# Patient Record
Sex: MALE | Race: BLACK OR AFRICAN AMERICAN | Employment: UNEMPLOYED | URBAN - METROPOLITAN AREA
[De-identification: names, ages, dates, MRNs, and addresses within clinical notes are randomized per-mention and may not be internally consistent; named-entity substitution may affect disease eponyms.]

---

## 2018-05-03 ENCOUNTER — APPOINTMENT (OUTPATIENT)
Dept: GENERAL RADIOLOGY | Age: 28
DRG: 912 | End: 2018-05-03
Payer: COMMERCIAL

## 2018-05-03 ENCOUNTER — ANESTHESIA EVENT (OUTPATIENT)
Dept: OPERATING ROOM | Age: 28
DRG: 912 | End: 2018-05-03
Payer: COMMERCIAL

## 2018-05-03 ENCOUNTER — APPOINTMENT (OUTPATIENT)
Dept: CT IMAGING | Age: 28
DRG: 912 | End: 2018-05-03
Payer: COMMERCIAL

## 2018-05-03 ENCOUNTER — ANESTHESIA (OUTPATIENT)
Dept: OPERATING ROOM | Age: 28
DRG: 912 | End: 2018-05-03
Payer: COMMERCIAL

## 2018-05-03 ENCOUNTER — HOSPITAL ENCOUNTER (INPATIENT)
Age: 28
LOS: 3 days | Discharge: HOME HEALTH | DRG: 912 | End: 2018-05-06
Attending: EMERGENCY MEDICINE | Admitting: SURGERY
Payer: COMMERCIAL

## 2018-05-03 VITALS
DIASTOLIC BLOOD PRESSURE: 106 MMHG | RESPIRATION RATE: 13 BRPM | OXYGEN SATURATION: 100 % | TEMPERATURE: 96.8 F | SYSTOLIC BLOOD PRESSURE: 170 MMHG
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
## 2018-05-03 DIAGNOSIS — T14.90XA TRAUMA: ICD-10-CM

## 2018-05-03 LAB
% INHIBITION AA: 2.3 %
% INHIBITION ADP: 29.2 %
ABO/RH: NORMAL
ACETAMINOPHEN LEVEL: <5 MCG/ML (ref 10–30)
ALBUMIN SERPL-MCNC: 4.8 G/DL (ref 3.5–5.2)
ALP BLD-CCNC: 67 U/L (ref 40–129)
ALT SERPL-CCNC: 16 U/L (ref 0–40)
ANGLE (CLOT STRENGTH): 70.7 DEGREE (ref 59–74)
ANION GAP SERPL CALCULATED.3IONS-SCNC: 29 MMOL/L (ref 7–16)
ANTIBODY SCREEN: NORMAL
APTT: 31.7 SEC (ref 24.5–35.1)
AST SERPL-CCNC: 30 U/L (ref 0–39)
B.E.: -12.7 MMOL/L (ref -3–3)
BILIRUB SERPL-MCNC: 0.5 MG/DL (ref 0–1.2)
BUN BLDV-MCNC: 11 MG/DL (ref 6–20)
CALCIUM SERPL-MCNC: 9.2 MG/DL (ref 8.6–10.2)
CHLORIDE BLD-SCNC: 98 MMOL/L (ref 98–107)
CK MB: 2.9 NG/ML (ref 0–7.7)
CO2: 16 MMOL/L (ref 22–29)
COHB: 0.9 % (ref 0–1.5)
CREAT SERPL-MCNC: 1.2 MG/DL (ref 0.7–1.2)
CRITICAL: ABNORMAL
DATE ANALYZED: ABNORMAL
DATE OF COLLECTION: ABNORMAL
EPL-TEG: 12.9 % (ref 0–15)
ETHANOL: <10 MG/DL (ref 0–0.08)
G-TEG: 5.9 K D/SC (ref 4.5–11)
GFR AFRICAN AMERICAN: >60
GFR NON-AFRICAN AMERICAN: >60 ML/MIN/1.73
GLUCOSE BLD-MCNC: 145 MG/DL (ref 74–109)
HCO3: 13.4 MMOL/L (ref 22–26)
HCT VFR BLD CALC: 44.4 % (ref 37–54)
HEMOGLOBIN: 14.6 G/DL (ref 12.5–16.5)
HHB: 0.6 % (ref 0–5)
INR BLD: 1
K (CLOTTING TIME): 1.2 MIN (ref 1–3)
LAB: ABNORMAL
LACTIC ACID: 13.8 MMOL/L (ref 0.5–2.2)
LY30 (FIBRINOLYSIS): 12.9 % (ref 0–8)
Lab: 1840
MA (MAX AMPLITUDE): 54.1 MM (ref 50–70)
MA-AA: 52.9 MM
MA-ACTIVATED: 2.4 MM
MA-ADP: 39 MM
MA-TEG BASELINE: 54.1 MM
MCH RBC QN AUTO: 28.4 PG (ref 26–35)
MCHC RBC AUTO-ENTMCNC: 32.9 % (ref 32–34.5)
MCV RBC AUTO: 86.4 FL (ref 80–99.9)
METHB: 0.5 % (ref 0–1.5)
MODE: ABNORMAL
O2 CONTENT: 21.6 ML/DL
O2 SATURATION: 99.4 % (ref 92–98.5)
O2HB: 98 % (ref 94–97)
OPERATOR ID: 7490
PATIENT TEMP: 37 C
PCO2: 31.9 MMHG (ref 35–45)
PDW BLD-RTO: 12.7 FL (ref 11.5–15)
PH BLOOD GAS: 7.24 (ref 7.35–7.45)
PLATELET # BLD: 262 E9/L (ref 130–450)
PMV BLD AUTO: 11.3 FL (ref 7–12)
PO2: 400.6 MMHG (ref 60–100)
POTASSIUM SERPL-SCNC: 3.02 MMOL/L (ref 3.3–5.1)
POTASSIUM SERPL-SCNC: 3.5 MMOL/L (ref 3.5–5)
PROTHROMBIN TIME: 11.4 SEC (ref 9.3–12.4)
R (REACTION TIME): 4.7 MIN (ref 5–10)
RBC # BLD: 5.14 E12/L (ref 3.8–5.8)
SALICYLATE, SERUM: <0.3 MG/DL (ref 0–30)
SODIUM BLD-SCNC: 143 MMOL/L (ref 132–146)
SOURCE, BLOOD GAS: ABNORMAL
THB: 14.9 G/DL (ref 11.5–16.5)
TIME ANALYZED: 1843
TOTAL CK: 490 U/L (ref 20–200)
TOTAL PROTEIN: 7.3 G/DL (ref 6.4–8.3)
TRICYCLIC ANTIDEPRESSANTS SCREEN SERUM: NEGATIVE NG/ML
TROPONIN: <0.01 NG/ML (ref 0–0.03)
WBC # BLD: 14.1 E9/L (ref 4.5–11.5)

## 2018-05-03 PROCEDURE — 0WJG4ZZ INSPECTION OF PERITONEAL CAVITY, PERCUTANEOUS ENDOSCOPIC APPROACH: ICD-10-PCS | Performed by: SURGERY

## 2018-05-03 PROCEDURE — 70450 CT HEAD/BRAIN W/O DYE: CPT

## 2018-05-03 PROCEDURE — 2709999900 HC NON-CHARGEABLE SUPPLY: Performed by: SURGERY

## 2018-05-03 PROCEDURE — 1200000000 HC SEMI PRIVATE

## 2018-05-03 PROCEDURE — 84484 ASSAY OF TROPONIN QUANT: CPT

## 2018-05-03 PROCEDURE — 85384 FIBRINOGEN ACTIVITY: CPT

## 2018-05-03 PROCEDURE — 84132 ASSAY OF SERUM POTASSIUM: CPT

## 2018-05-03 PROCEDURE — 80307 DRUG TEST PRSMV CHEM ANLYZR: CPT

## 2018-05-03 PROCEDURE — 85730 THROMBOPLASTIN TIME PARTIAL: CPT

## 2018-05-03 PROCEDURE — 6360000002 HC RX W HCPCS: Performed by: NURSE ANESTHETIST, CERTIFIED REGISTERED

## 2018-05-03 PROCEDURE — 88304 TISSUE EXAM BY PATHOLOGIST: CPT

## 2018-05-03 PROCEDURE — 49320 DIAG LAPARO SEPARATE PROC: CPT | Performed by: SURGERY

## 2018-05-03 PROCEDURE — 86900 BLOOD TYPING SEROLOGIC ABO: CPT

## 2018-05-03 PROCEDURE — 75635 CT ANGIO ABDOMINAL ARTERIES: CPT

## 2018-05-03 PROCEDURE — 36410 VNPNXR 3YR/> PHY/QHP DX/THER: CPT | Performed by: SURGERY

## 2018-05-03 PROCEDURE — 27508 TREATMENT OF THIGH FRACTURE: CPT | Performed by: ORTHOPAEDIC SURGERY

## 2018-05-03 PROCEDURE — 2580000003 HC RX 258: Performed by: STUDENT IN AN ORGANIZED HEALTH CARE EDUCATION/TRAINING PROGRAM

## 2018-05-03 PROCEDURE — 85610 PROTHROMBIN TIME: CPT

## 2018-05-03 PROCEDURE — 6360000002 HC RX W HCPCS: Performed by: STUDENT IN AN ORGANIZED HEALTH CARE EDUCATION/TRAINING PROGRAM

## 2018-05-03 PROCEDURE — 86901 BLOOD TYPING SEROLOGIC RH(D): CPT

## 2018-05-03 PROCEDURE — 80053 COMPREHEN METABOLIC PANEL: CPT

## 2018-05-03 PROCEDURE — 86923 COMPATIBILITY TEST ELECTRIC: CPT

## 2018-05-03 PROCEDURE — 82805 BLOOD GASES W/O2 SATURATION: CPT

## 2018-05-03 PROCEDURE — 99255 IP/OBS CONSLTJ NEW/EST HI 80: CPT | Performed by: SURGERY

## 2018-05-03 PROCEDURE — 86850 RBC ANTIBODY SCREEN: CPT

## 2018-05-03 PROCEDURE — 36415 COLL VENOUS BLD VENIPUNCTURE: CPT

## 2018-05-03 PROCEDURE — 85347 COAGULATION TIME ACTIVATED: CPT

## 2018-05-03 PROCEDURE — 74018 RADEX ABDOMEN 1 VIEW: CPT

## 2018-05-03 PROCEDURE — 83605 ASSAY OF LACTIC ACID: CPT

## 2018-05-03 PROCEDURE — 3700000001 HC ADD 15 MINUTES (ANESTHESIA): Performed by: SURGERY

## 2018-05-03 PROCEDURE — 73560 X-RAY EXAM OF KNEE 1 OR 2: CPT

## 2018-05-03 PROCEDURE — 71260 CT THORAX DX C+: CPT

## 2018-05-03 PROCEDURE — 82553 CREATINE MB FRACTION: CPT

## 2018-05-03 PROCEDURE — 2500000003 HC RX 250 WO HCPCS: Performed by: NURSE ANESTHETIST, CERTIFIED REGISTERED

## 2018-05-03 PROCEDURE — 35741: CPT | Performed by: SURGERY

## 2018-05-03 PROCEDURE — 99253 IP/OBS CNSLTJ NEW/EST LOW 45: CPT | Performed by: ORTHOPAEDIC SURGERY

## 2018-05-03 PROCEDURE — 71045 X-RAY EXAM CHEST 1 VIEW: CPT

## 2018-05-03 PROCEDURE — 82550 ASSAY OF CK (CPK): CPT

## 2018-05-03 PROCEDURE — 36000 PLACE NEEDLE IN VEIN: CPT | Performed by: SURGERY

## 2018-05-03 PROCEDURE — 6360000004 HC RX CONTRAST MEDICATION: Performed by: RADIOLOGY

## 2018-05-03 PROCEDURE — 3600000014 HC SURGERY LEVEL 4 ADDTL 15MIN: Performed by: SURGERY

## 2018-05-03 PROCEDURE — 99291 CRITICAL CARE FIRST HOUR: CPT

## 2018-05-03 PROCEDURE — 94150 VITAL CAPACITY TEST: CPT

## 2018-05-03 PROCEDURE — 27331 EXPLORE/TREAT KNEE JOINT: CPT | Performed by: ORTHOPAEDIC SURGERY

## 2018-05-03 PROCEDURE — 7100000001 HC PACU RECOVERY - ADDTL 15 MIN: Performed by: SURGERY

## 2018-05-03 PROCEDURE — 3700000000 HC ANESTHESIA ATTENDED CARE: Performed by: SURGERY

## 2018-05-03 PROCEDURE — 85576 BLOOD PLATELET AGGREGATION: CPT

## 2018-05-03 PROCEDURE — 3600000004 HC SURGERY LEVEL 4 BASE: Performed by: SURGERY

## 2018-05-03 PROCEDURE — 85027 COMPLETE CBC AUTOMATED: CPT

## 2018-05-03 PROCEDURE — 99223 1ST HOSP IP/OBS HIGH 75: CPT | Performed by: SURGERY

## 2018-05-03 PROCEDURE — 72170 X-RAY EXAM OF PELVIS: CPT

## 2018-05-03 PROCEDURE — 27197 CLSD TX PELVIC RING FX: CPT | Performed by: ORTHOPAEDIC SURGERY

## 2018-05-03 PROCEDURE — 7100000000 HC PACU RECOVERY - FIRST 15 MIN: Performed by: SURGERY

## 2018-05-03 PROCEDURE — 36600 WITHDRAWAL OF ARTERIAL BLOOD: CPT | Performed by: SURGERY

## 2018-05-03 RX ORDER — ONDANSETRON 2 MG/ML
4 INJECTION INTRAMUSCULAR; INTRAVENOUS EVERY 6 HOURS PRN
Status: DISCONTINUED | OUTPATIENT
Start: 2018-05-03 | End: 2018-05-03 | Stop reason: SDUPTHER

## 2018-05-03 RX ORDER — FENTANYL CITRATE 50 UG/ML
INJECTION, SOLUTION INTRAMUSCULAR; INTRAVENOUS PRN
Status: DISCONTINUED | OUTPATIENT
Start: 2018-05-03 | End: 2018-05-03 | Stop reason: SDUPTHER

## 2018-05-03 RX ORDER — MORPHINE SULFATE 2 MG/ML
2 INJECTION, SOLUTION INTRAMUSCULAR; INTRAVENOUS
Status: DISCONTINUED | OUTPATIENT
Start: 2018-05-03 | End: 2018-05-05

## 2018-05-03 RX ORDER — MIDAZOLAM HYDROCHLORIDE 1 MG/ML
INJECTION INTRAMUSCULAR; INTRAVENOUS PRN
Status: DISCONTINUED | OUTPATIENT
Start: 2018-05-03 | End: 2018-05-03 | Stop reason: SDUPTHER

## 2018-05-03 RX ORDER — SODIUM CHLORIDE 0.9 % (FLUSH) 0.9 %
10 SYRINGE (ML) INJECTION PRN
Status: DISCONTINUED | OUTPATIENT
Start: 2018-05-03 | End: 2018-05-06 | Stop reason: HOSPADM

## 2018-05-03 RX ORDER — SODIUM CHLORIDE 0.9 % (FLUSH) 0.9 %
10 SYRINGE (ML) INJECTION EVERY 12 HOURS SCHEDULED
Status: DISCONTINUED | OUTPATIENT
Start: 2018-05-03 | End: 2018-05-06 | Stop reason: HOSPADM

## 2018-05-03 RX ORDER — ACETAMINOPHEN 325 MG/1
650 TABLET ORAL EVERY 4 HOURS
Status: DISCONTINUED | OUTPATIENT
Start: 2018-05-03 | End: 2018-05-06 | Stop reason: HOSPADM

## 2018-05-03 RX ORDER — ONDANSETRON 2 MG/ML
INJECTION INTRAMUSCULAR; INTRAVENOUS PRN
Status: DISCONTINUED | OUTPATIENT
Start: 2018-05-03 | End: 2018-05-03 | Stop reason: SDUPTHER

## 2018-05-03 RX ORDER — SUCCINYLCHOLINE CHLORIDE 20 MG/ML
INJECTION INTRAMUSCULAR; INTRAVENOUS PRN
Status: DISCONTINUED | OUTPATIENT
Start: 2018-05-03 | End: 2018-05-03 | Stop reason: SDUPTHER

## 2018-05-03 RX ORDER — BISACODYL 10 MG
10 SUPPOSITORY, RECTAL RECTAL DAILY PRN
Status: DISCONTINUED | OUTPATIENT
Start: 2018-05-03 | End: 2018-05-06 | Stop reason: HOSPADM

## 2018-05-03 RX ORDER — DOCUSATE SODIUM 100 MG/1
100 CAPSULE, LIQUID FILLED ORAL 2 TIMES DAILY
Status: DISCONTINUED | OUTPATIENT
Start: 2018-05-03 | End: 2018-05-06 | Stop reason: HOSPADM

## 2018-05-03 RX ORDER — ONDANSETRON 2 MG/ML
4 INJECTION INTRAMUSCULAR; INTRAVENOUS EVERY 6 HOURS PRN
Status: DISCONTINUED | OUTPATIENT
Start: 2018-05-03 | End: 2018-05-06 | Stop reason: HOSPADM

## 2018-05-03 RX ORDER — PROPOFOL 10 MG/ML
INJECTION, EMULSION INTRAVENOUS PRN
Status: DISCONTINUED | OUTPATIENT
Start: 2018-05-03 | End: 2018-05-03 | Stop reason: SDUPTHER

## 2018-05-03 RX ORDER — OXYCODONE HYDROCHLORIDE 5 MG/1
5 TABLET ORAL EVERY 4 HOURS PRN
Status: DISCONTINUED | OUTPATIENT
Start: 2018-05-03 | End: 2018-05-06 | Stop reason: HOSPADM

## 2018-05-03 RX ORDER — GLYCOPYRROLATE 1 MG/5 ML
SYRINGE (ML) INTRAVENOUS PRN
Status: DISCONTINUED | OUTPATIENT
Start: 2018-05-03 | End: 2018-05-03 | Stop reason: SDUPTHER

## 2018-05-03 RX ORDER — SENNA AND DOCUSATE SODIUM 50; 8.6 MG/1; MG/1
1 TABLET, FILM COATED ORAL DAILY
Status: DISCONTINUED | OUTPATIENT
Start: 2018-05-04 | End: 2018-05-06 | Stop reason: HOSPADM

## 2018-05-03 RX ORDER — MORPHINE SULFATE 4 MG/ML
4 INJECTION, SOLUTION INTRAMUSCULAR; INTRAVENOUS
Status: DISCONTINUED | OUTPATIENT
Start: 2018-05-03 | End: 2018-05-05

## 2018-05-03 RX ORDER — NEOSTIGMINE METHYLSULFATE 1 MG/ML
INJECTION, SOLUTION INTRAVENOUS PRN
Status: DISCONTINUED | OUTPATIENT
Start: 2018-05-03 | End: 2018-05-03 | Stop reason: SDUPTHER

## 2018-05-03 RX ORDER — OXYCODONE HYDROCHLORIDE 10 MG/1
10 TABLET ORAL EVERY 4 HOURS PRN
Status: DISCONTINUED | OUTPATIENT
Start: 2018-05-03 | End: 2018-05-06 | Stop reason: HOSPADM

## 2018-05-03 RX ORDER — ROCURONIUM BROMIDE 10 MG/ML
INJECTION, SOLUTION INTRAVENOUS PRN
Status: DISCONTINUED | OUTPATIENT
Start: 2018-05-03 | End: 2018-05-03 | Stop reason: SDUPTHER

## 2018-05-03 RX ADMIN — Medication 140 MG: at 20:06

## 2018-05-03 RX ADMIN — PROPOFOL 150 MG: 10 INJECTION, EMULSION INTRAVENOUS at 20:06

## 2018-05-03 RX ADMIN — CEFOXITIN 2 G: 2 INJECTION, POWDER, FOR SOLUTION INTRAVENOUS at 20:09

## 2018-05-03 RX ADMIN — LIDOCAINE HYDROCHLORIDE 100 MG: 20 INJECTION, SOLUTION INTRAVENOUS at 20:06

## 2018-05-03 RX ADMIN — FENTANYL CITRATE 50 MCG: 50 INJECTION, SOLUTION INTRAMUSCULAR; INTRAVENOUS at 20:43

## 2018-05-03 RX ADMIN — ONDANSETRON 4 MG: 2 INJECTION INTRAMUSCULAR; INTRAVENOUS at 21:27

## 2018-05-03 RX ADMIN — Medication 0.6 MG: at 21:26

## 2018-05-03 RX ADMIN — FENTANYL CITRATE 100 MCG: 50 INJECTION, SOLUTION INTRAMUSCULAR; INTRAVENOUS at 20:06

## 2018-05-03 RX ADMIN — MIDAZOLAM 2 MG: 1 INJECTION INTRAMUSCULAR; INTRAVENOUS at 20:03

## 2018-05-03 RX ADMIN — IOPAMIDOL 200 ML: 755 INJECTION, SOLUTION INTRAVENOUS at 19:20

## 2018-05-03 RX ADMIN — ROCURONIUM BROMIDE 10 MG: 10 INJECTION, SOLUTION INTRAVENOUS at 20:06

## 2018-05-03 RX ADMIN — Medication 3 MG: at 21:26

## 2018-05-03 RX ADMIN — FENTANYL CITRATE 100 MCG: 50 INJECTION, SOLUTION INTRAMUSCULAR; INTRAVENOUS at 20:35

## 2018-05-03 ASSESSMENT — PULMONARY FUNCTION TESTS
PIF_VALUE: 18
PIF_VALUE: 17
PIF_VALUE: 22
PIF_VALUE: 17
PIF_VALUE: 22
PIF_VALUE: 17
PIF_VALUE: 17
PIF_VALUE: 21
PIF_VALUE: 1
PIF_VALUE: 21
PIF_VALUE: 20
PIF_VALUE: 23
PIF_VALUE: 18
PIF_VALUE: 17
PIF_VALUE: 18
PIF_VALUE: 17
PIF_VALUE: 17
PIF_VALUE: 8
PIF_VALUE: 16
PIF_VALUE: 17
PIF_VALUE: 18
PIF_VALUE: 20
PIF_VALUE: 18
PIF_VALUE: 21
PIF_VALUE: 17
PIF_VALUE: 7
PIF_VALUE: 11
PIF_VALUE: 17
PIF_VALUE: 21
PIF_VALUE: 23
PIF_VALUE: 20
PIF_VALUE: 19
PIF_VALUE: 8
PIF_VALUE: 17
PIF_VALUE: 22
PIF_VALUE: 17
PIF_VALUE: 23
PIF_VALUE: 20
PIF_VALUE: 8
PIF_VALUE: 2
PIF_VALUE: 18
PIF_VALUE: 17
PIF_VALUE: 17
PIF_VALUE: 18
PIF_VALUE: 17
PIF_VALUE: 24
PIF_VALUE: 19
PIF_VALUE: 18
PIF_VALUE: 18
PIF_VALUE: 17
PIF_VALUE: 17
PIF_VALUE: 16
PIF_VALUE: 16
PIF_VALUE: 8
PIF_VALUE: 11
PIF_VALUE: 7
PIF_VALUE: 17
PIF_VALUE: 18
PIF_VALUE: 16
PIF_VALUE: 22
PIF_VALUE: 17
PIF_VALUE: 19
PIF_VALUE: 18
PIF_VALUE: 19
PIF_VALUE: 1
PIF_VALUE: 17
PIF_VALUE: 17
PIF_VALUE: 8
PIF_VALUE: 19
PIF_VALUE: 17
PIF_VALUE: 16
PIF_VALUE: 21
PIF_VALUE: 17
PIF_VALUE: 23
PIF_VALUE: 17
PIF_VALUE: 18
PIF_VALUE: 17
PIF_VALUE: 16
PIF_VALUE: 12
PIF_VALUE: 13
PIF_VALUE: 20
PIF_VALUE: 7
PIF_VALUE: 20
PIF_VALUE: 17
PIF_VALUE: 18
PIF_VALUE: 10
PIF_VALUE: 19
PIF_VALUE: 18
PIF_VALUE: 7
PIF_VALUE: 13
PIF_VALUE: 16
PIF_VALUE: 17
PIF_VALUE: 26
PIF_VALUE: 18
PIF_VALUE: 17
PIF_VALUE: 23
PIF_VALUE: 18
PIF_VALUE: 4
PIF_VALUE: 17
PIF_VALUE: 18
PIF_VALUE: 18
PIF_VALUE: 19
PIF_VALUE: 5
PIF_VALUE: 12
PIF_VALUE: 23
PIF_VALUE: 18

## 2018-05-03 ASSESSMENT — PAIN SCALES - GENERAL
PAINLEVEL_OUTOF10: 0

## 2018-05-03 ASSESSMENT — PAIN DESCRIPTION - PAIN TYPE
TYPE: SURGICAL PAIN
TYPE: SURGICAL PAIN

## 2018-05-03 ASSESSMENT — PAIN DESCRIPTION - LOCATION: LOCATION: ABDOMEN

## 2018-05-04 ENCOUNTER — APPOINTMENT (OUTPATIENT)
Dept: CT IMAGING | Age: 28
DRG: 912 | End: 2018-05-04
Payer: COMMERCIAL

## 2018-05-04 PROBLEM — S00.01XA SCALP ABRASION: Status: ACTIVE | Noted: 2018-05-04

## 2018-05-04 PROBLEM — S72.421B: Status: ACTIVE | Noted: 2018-05-04

## 2018-05-04 PROBLEM — S32.301A CLOSED FRACTURE OF RIGHT ILIAC WING (HCC): Status: ACTIVE | Noted: 2018-05-04

## 2018-05-04 PROBLEM — S82.131B: Status: ACTIVE | Noted: 2018-05-04

## 2018-05-04 PROBLEM — S72.411S: Status: ACTIVE | Noted: 2018-05-04

## 2018-05-04 LAB
ALBUMIN SERPL-MCNC: 3.7 G/DL (ref 3.5–5.2)
ALP BLD-CCNC: 40 U/L (ref 40–129)
ALT SERPL-CCNC: 23 U/L (ref 0–40)
ANION GAP SERPL CALCULATED.3IONS-SCNC: 12 MMOL/L (ref 7–16)
AST SERPL-CCNC: 56 U/L (ref 0–39)
BASOPHILS ABSOLUTE: 0.03 E9/L (ref 0–0.2)
BASOPHILS RELATIVE PERCENT: 0.2 % (ref 0–2)
BILIRUB SERPL-MCNC: 0.6 MG/DL (ref 0–1.2)
BUN BLDV-MCNC: 8 MG/DL (ref 6–20)
CALCIUM SERPL-MCNC: 8 MG/DL (ref 8.6–10.2)
CHLORIDE BLD-SCNC: 99 MMOL/L (ref 98–107)
CO2: 23 MMOL/L (ref 22–29)
CREAT SERPL-MCNC: 0.9 MG/DL (ref 0.7–1.2)
EOSINOPHILS ABSOLUTE: 0 E9/L (ref 0.05–0.5)
EOSINOPHILS RELATIVE PERCENT: 0 % (ref 0–6)
GFR AFRICAN AMERICAN: >60
GFR NON-AFRICAN AMERICAN: >60 ML/MIN/1.73
GLUCOSE BLD-MCNC: 147 MG/DL (ref 74–109)
HCT VFR BLD CALC: 34.6 % (ref 37–54)
HEMOGLOBIN: 11.9 G/DL (ref 12.5–16.5)
IMMATURE GRANULOCYTES #: 0.08 E9/L
IMMATURE GRANULOCYTES %: 0.5 % (ref 0–5)
LYMPHOCYTES ABSOLUTE: 1.11 E9/L (ref 1.5–4)
LYMPHOCYTES RELATIVE PERCENT: 6.3 % (ref 20–42)
MCH RBC QN AUTO: 28.6 PG (ref 26–35)
MCHC RBC AUTO-ENTMCNC: 34.4 % (ref 32–34.5)
MCV RBC AUTO: 83.2 FL (ref 80–99.9)
MONOCYTES ABSOLUTE: 1.77 E9/L (ref 0.1–0.95)
MONOCYTES RELATIVE PERCENT: 10 % (ref 2–12)
NEUTROPHILS ABSOLUTE: 14.69 E9/L (ref 1.8–7.3)
NEUTROPHILS RELATIVE PERCENT: 83 % (ref 43–80)
PDW BLD-RTO: 12.7 FL (ref 11.5–15)
PLATELET # BLD: 204 E9/L (ref 130–450)
PMV BLD AUTO: 11.2 FL (ref 7–12)
POTASSIUM REFLEX MAGNESIUM: 3.9 MMOL/L (ref 3.5–5)
RBC # BLD: 4.16 E12/L (ref 3.8–5.8)
RBC # BLD: NORMAL 10*6/UL
SODIUM BLD-SCNC: 134 MMOL/L (ref 132–146)
TOTAL PROTEIN: 5.8 G/DL (ref 6.4–8.3)
WBC # BLD: 17.7 E9/L (ref 4.5–11.5)

## 2018-05-04 PROCEDURE — 80053 COMPREHEN METABOLIC PANEL: CPT

## 2018-05-04 PROCEDURE — 6360000002 HC RX W HCPCS: Performed by: STUDENT IN AN ORGANIZED HEALTH CARE EDUCATION/TRAINING PROGRAM

## 2018-05-04 PROCEDURE — 1200000000 HC SEMI PRIVATE

## 2018-05-04 PROCEDURE — 85025 COMPLETE CBC W/AUTO DIFF WBC: CPT

## 2018-05-04 PROCEDURE — 97165 OT EVAL LOW COMPLEX 30 MIN: CPT

## 2018-05-04 PROCEDURE — 97162 PT EVAL MOD COMPLEX 30 MIN: CPT

## 2018-05-04 PROCEDURE — 2580000003 HC RX 258: Performed by: STUDENT IN AN ORGANIZED HEALTH CARE EDUCATION/TRAINING PROGRAM

## 2018-05-04 PROCEDURE — 99024 POSTOP FOLLOW-UP VISIT: CPT | Performed by: ORTHOPAEDIC SURGERY

## 2018-05-04 PROCEDURE — G8978 MOBILITY CURRENT STATUS: HCPCS

## 2018-05-04 PROCEDURE — 6370000000 HC RX 637 (ALT 250 FOR IP): Performed by: STUDENT IN AN ORGANIZED HEALTH CARE EDUCATION/TRAINING PROGRAM

## 2018-05-04 PROCEDURE — G8979 MOBILITY GOAL STATUS: HCPCS

## 2018-05-04 PROCEDURE — 0SBC0ZZ EXCISION OF RIGHT KNEE JOINT, OPEN APPROACH: ICD-10-PCS | Performed by: ORTHOPAEDIC SURGERY

## 2018-05-04 PROCEDURE — 73700 CT LOWER EXTREMITY W/O DYE: CPT

## 2018-05-04 PROCEDURE — G8987 SELF CARE CURRENT STATUS: HCPCS

## 2018-05-04 PROCEDURE — 99232 SBSQ HOSP IP/OBS MODERATE 35: CPT | Performed by: SURGERY

## 2018-05-04 PROCEDURE — G8988 SELF CARE GOAL STATUS: HCPCS

## 2018-05-04 PROCEDURE — 0JDN0ZZ EXTRACTION OF RIGHT LOWER LEG SUBCUTANEOUS TISSUE AND FASCIA, OPEN APPROACH: ICD-10-PCS | Performed by: ORTHOPAEDIC SURGERY

## 2018-05-04 PROCEDURE — 36415 COLL VENOUS BLD VENIPUNCTURE: CPT

## 2018-05-04 RX ORDER — DIAPER,BRIEF,INFANT-TODD,DISP
EACH MISCELLANEOUS 3 TIMES DAILY
Status: DISCONTINUED | OUTPATIENT
Start: 2018-05-04 | End: 2018-05-06 | Stop reason: HOSPADM

## 2018-05-04 RX ADMIN — CEFAZOLIN SODIUM 2 G: 2 SOLUTION INTRAVENOUS at 13:48

## 2018-05-04 RX ADMIN — OXYCODONE HYDROCHLORIDE 10 MG: 10 TABLET ORAL at 10:55

## 2018-05-04 RX ADMIN — ACETAMINOPHEN 650 MG: 325 TABLET, FILM COATED ORAL at 05:27

## 2018-05-04 RX ADMIN — Medication 10 ML: at 21:55

## 2018-05-04 RX ADMIN — Medication 10 ML: at 01:24

## 2018-05-04 RX ADMIN — OXYCODONE HYDROCHLORIDE 10 MG: 10 TABLET ORAL at 21:55

## 2018-05-04 RX ADMIN — ENOXAPARIN SODIUM 30 MG: 30 INJECTION SUBCUTANEOUS at 21:55

## 2018-05-04 RX ADMIN — CEFAZOLIN SODIUM 2 G: 2 SOLUTION INTRAVENOUS at 05:28

## 2018-05-04 RX ADMIN — OXYCODONE HYDROCHLORIDE 10 MG: 10 TABLET ORAL at 01:23

## 2018-05-04 RX ADMIN — ACETAMINOPHEN 650 MG: 325 TABLET, FILM COATED ORAL at 09:55

## 2018-05-04 RX ADMIN — OXYCODONE HYDROCHLORIDE 10 MG: 10 TABLET ORAL at 16:01

## 2018-05-04 RX ADMIN — ACETAMINOPHEN 650 MG: 325 TABLET, FILM COATED ORAL at 21:55

## 2018-05-04 RX ADMIN — DOCUSATE SODIUM 100 MG: 100 CAPSULE, LIQUID FILLED ORAL at 21:55

## 2018-05-04 RX ADMIN — ENOXAPARIN SODIUM 30 MG: 30 INJECTION SUBCUTANEOUS at 09:55

## 2018-05-04 RX ADMIN — STANDARDIZED SENNA CONCENTRATE AND DOCUSATE SODIUM 1 TABLET: 8.6; 5 TABLET, FILM COATED ORAL at 09:55

## 2018-05-04 RX ADMIN — MORPHINE SULFATE 4 MG: 4 INJECTION, SOLUTION INTRAMUSCULAR; INTRAVENOUS at 17:25

## 2018-05-04 RX ADMIN — ACETAMINOPHEN 650 MG: 325 TABLET, FILM COATED ORAL at 01:23

## 2018-05-04 RX ADMIN — MORPHINE SULFATE 4 MG: 4 INJECTION, SOLUTION INTRAMUSCULAR; INTRAVENOUS at 09:53

## 2018-05-04 RX ADMIN — DOCUSATE SODIUM 100 MG: 100 CAPSULE, LIQUID FILLED ORAL at 01:23

## 2018-05-04 RX ADMIN — ACETAMINOPHEN 650 MG: 325 TABLET, FILM COATED ORAL at 14:07

## 2018-05-04 RX ADMIN — OXYCODONE HYDROCHLORIDE 10 MG: 10 TABLET ORAL at 05:28

## 2018-05-04 RX ADMIN — BACITRACIN ZINC: 500 OINTMENT TOPICAL at 21:56

## 2018-05-04 RX ADMIN — DOCUSATE SODIUM 100 MG: 100 CAPSULE, LIQUID FILLED ORAL at 09:55

## 2018-05-04 ASSESSMENT — PAIN DESCRIPTION - DESCRIPTORS
DESCRIPTORS: ACHING;CONSTANT;DISCOMFORT
DESCRIPTORS: ACHING;CONSTANT;DISCOMFORT
DESCRIPTORS: ACHING;CONSTANT;TENDER
DESCRIPTORS: DISCOMFORT;SORE;TENDER
DESCRIPTORS: DISCOMFORT;SORE;TENDER
DESCRIPTORS: ACHING;CONSTANT;DISCOMFORT
DESCRIPTORS: DISCOMFORT;SORE
DESCRIPTORS: ACHING;CONSTANT;DISCOMFORT
DESCRIPTORS: DISCOMFORT;SHARP;SORE
DESCRIPTORS: DISCOMFORT;TENDER
DESCRIPTORS: ACHING;CONSTANT;DISCOMFORT
DESCRIPTORS: DISCOMFORT
DESCRIPTORS: ACHING;CONSTANT;DISCOMFORT

## 2018-05-04 ASSESSMENT — PAIN DESCRIPTION - LOCATION
LOCATION: ABDOMEN

## 2018-05-04 ASSESSMENT — PAIN SCALES - GENERAL
PAINLEVEL_OUTOF10: 2
PAINLEVEL_OUTOF10: 10
PAINLEVEL_OUTOF10: 9
PAINLEVEL_OUTOF10: 4
PAINLEVEL_OUTOF10: 6
PAINLEVEL_OUTOF10: 10
PAINLEVEL_OUTOF10: 10
PAINLEVEL_OUTOF10: 4
PAINLEVEL_OUTOF10: 9
PAINLEVEL_OUTOF10: 7
PAINLEVEL_OUTOF10: 4
PAINLEVEL_OUTOF10: 2
PAINLEVEL_OUTOF10: 10
PAINLEVEL_OUTOF10: 6
PAINLEVEL_OUTOF10: 10
PAINLEVEL_OUTOF10: 10
PAINLEVEL_OUTOF10: 4
PAINLEVEL_OUTOF10: 3

## 2018-05-04 ASSESSMENT — PAIN DESCRIPTION - PROGRESSION
CLINICAL_PROGRESSION: NOT CHANGED
CLINICAL_PROGRESSION: GRADUALLY IMPROVING
CLINICAL_PROGRESSION: NOT CHANGED

## 2018-05-04 ASSESSMENT — PAIN DESCRIPTION - ONSET
ONSET: ON-GOING
ONSET: OTHER (COMMENT)
ONSET: ON-GOING

## 2018-05-04 ASSESSMENT — PAIN DESCRIPTION - FREQUENCY
FREQUENCY: CONTINUOUS

## 2018-05-04 ASSESSMENT — PAIN DESCRIPTION - PAIN TYPE
TYPE: ACUTE PAIN;SURGICAL PAIN
TYPE: SURGICAL PAIN
TYPE: ACUTE PAIN;SURGICAL PAIN
TYPE: SURGICAL PAIN
TYPE: ACUTE PAIN;SURGICAL PAIN
TYPE: SURGICAL PAIN
TYPE: ACUTE PAIN;SURGICAL PAIN
TYPE: SURGICAL PAIN
TYPE: SURGICAL PAIN
TYPE: ACUTE PAIN
TYPE: SURGICAL PAIN
TYPE: ACUTE PAIN

## 2018-05-04 ASSESSMENT — PAIN DESCRIPTION - ORIENTATION
ORIENTATION: RIGHT

## 2018-05-05 LAB
ALBUMIN SERPL-MCNC: 3.8 G/DL (ref 3.5–5.2)
ALP BLD-CCNC: 45 U/L (ref 40–129)
ALT SERPL-CCNC: 26 U/L (ref 0–40)
ANION GAP SERPL CALCULATED.3IONS-SCNC: 16 MMOL/L (ref 7–16)
AST SERPL-CCNC: 61 U/L (ref 0–39)
BASOPHILS ABSOLUTE: 0.04 E9/L (ref 0–0.2)
BASOPHILS RELATIVE PERCENT: 0.3 % (ref 0–2)
BILIRUB SERPL-MCNC: 0.9 MG/DL (ref 0–1.2)
BUN BLDV-MCNC: 5 MG/DL (ref 6–20)
CALCIUM SERPL-MCNC: 8.8 MG/DL (ref 8.6–10.2)
CHLORIDE BLD-SCNC: 94 MMOL/L (ref 98–107)
CO2: 28 MMOL/L (ref 22–29)
CREAT SERPL-MCNC: 0.9 MG/DL (ref 0.7–1.2)
EOSINOPHILS ABSOLUTE: 0.06 E9/L (ref 0.05–0.5)
EOSINOPHILS RELATIVE PERCENT: 0.5 % (ref 0–6)
GFR AFRICAN AMERICAN: >60
GFR NON-AFRICAN AMERICAN: >60 ML/MIN/1.73
GLUCOSE BLD-MCNC: 105 MG/DL (ref 74–109)
HCT VFR BLD CALC: 34.1 % (ref 37–54)
HEMOGLOBIN: 11.7 G/DL (ref 12.5–16.5)
IMMATURE GRANULOCYTES #: 0.05 E9/L
IMMATURE GRANULOCYTES %: 0.4 % (ref 0–5)
LYMPHOCYTES ABSOLUTE: 1.1 E9/L (ref 1.5–4)
LYMPHOCYTES RELATIVE PERCENT: 8.5 % (ref 20–42)
MAGNESIUM: 1.9 MG/DL (ref 1.6–2.6)
MCH RBC QN AUTO: 28.5 PG (ref 26–35)
MCHC RBC AUTO-ENTMCNC: 34.3 % (ref 32–34.5)
MCV RBC AUTO: 83.2 FL (ref 80–99.9)
MONOCYTES ABSOLUTE: 1.3 E9/L (ref 0.1–0.95)
MONOCYTES RELATIVE PERCENT: 10.1 % (ref 2–12)
NEUTROPHILS ABSOLUTE: 10.35 E9/L (ref 1.8–7.3)
NEUTROPHILS RELATIVE PERCENT: 80.2 % (ref 43–80)
PDW BLD-RTO: 12.6 FL (ref 11.5–15)
PLATELET # BLD: 172 E9/L (ref 130–450)
PMV BLD AUTO: 11.4 FL (ref 7–12)
POTASSIUM REFLEX MAGNESIUM: 3.5 MMOL/L (ref 3.5–5)
RBC # BLD: 4.1 E12/L (ref 3.8–5.8)
SODIUM BLD-SCNC: 138 MMOL/L (ref 132–146)
TOTAL PROTEIN: 6.5 G/DL (ref 6.4–8.3)
WBC # BLD: 12.9 E9/L (ref 4.5–11.5)

## 2018-05-05 PROCEDURE — 1200000000 HC SEMI PRIVATE

## 2018-05-05 PROCEDURE — 6370000000 HC RX 637 (ALT 250 FOR IP): Performed by: STUDENT IN AN ORGANIZED HEALTH CARE EDUCATION/TRAINING PROGRAM

## 2018-05-05 PROCEDURE — 36415 COLL VENOUS BLD VENIPUNCTURE: CPT

## 2018-05-05 PROCEDURE — 6360000002 HC RX W HCPCS: Performed by: STUDENT IN AN ORGANIZED HEALTH CARE EDUCATION/TRAINING PROGRAM

## 2018-05-05 PROCEDURE — 99024 POSTOP FOLLOW-UP VISIT: CPT | Performed by: ORTHOPAEDIC SURGERY

## 2018-05-05 PROCEDURE — 80053 COMPREHEN METABOLIC PANEL: CPT

## 2018-05-05 PROCEDURE — 97530 THERAPEUTIC ACTIVITIES: CPT | Performed by: PHYSICAL THERAPIST

## 2018-05-05 PROCEDURE — 83735 ASSAY OF MAGNESIUM: CPT

## 2018-05-05 PROCEDURE — 85025 COMPLETE CBC W/AUTO DIFF WBC: CPT

## 2018-05-05 PROCEDURE — 2580000003 HC RX 258: Performed by: STUDENT IN AN ORGANIZED HEALTH CARE EDUCATION/TRAINING PROGRAM

## 2018-05-05 PROCEDURE — 99232 SBSQ HOSP IP/OBS MODERATE 35: CPT | Performed by: SURGERY

## 2018-05-05 RX ORDER — PSEUDOEPHEDRINE HCL 30 MG
100 TABLET ORAL 2 TIMES DAILY PRN
Qty: 100 CAPSULE | Refills: 0 | Status: SHIPPED | OUTPATIENT
Start: 2018-05-05 | End: 2018-06-25 | Stop reason: ALTCHOICE

## 2018-05-05 RX ORDER — METHOCARBAMOL 500 MG/1
1000 TABLET, FILM COATED ORAL 4 TIMES DAILY
Status: DISCONTINUED | OUTPATIENT
Start: 2018-05-05 | End: 2018-05-06 | Stop reason: HOSPADM

## 2018-05-05 RX ORDER — POLYETHYLENE GLYCOL 3350 17 G/17G
17 POWDER, FOR SOLUTION ORAL 2 TIMES DAILY
Status: DISCONTINUED | OUTPATIENT
Start: 2018-05-05 | End: 2018-05-06 | Stop reason: HOSPADM

## 2018-05-05 RX ORDER — DIPHENHYDRAMINE HYDROCHLORIDE 50 MG/ML
25 INJECTION INTRAMUSCULAR; INTRAVENOUS EVERY 6 HOURS PRN
Status: DISCONTINUED | OUTPATIENT
Start: 2018-05-05 | End: 2018-05-06 | Stop reason: HOSPADM

## 2018-05-05 RX ORDER — OXYCODONE HYDROCHLORIDE 5 MG/1
5 TABLET ORAL EVERY 6 HOURS PRN
Qty: 28 TABLET | Refills: 0 | Status: SHIPPED | OUTPATIENT
Start: 2018-05-05 | End: 2018-05-12

## 2018-05-05 RX ADMIN — OXYCODONE HYDROCHLORIDE 10 MG: 10 TABLET ORAL at 13:31

## 2018-05-05 RX ADMIN — POLYETHYLENE GLYCOL 3350 17 G: 17 POWDER, FOR SOLUTION ORAL at 21:12

## 2018-05-05 RX ADMIN — METHOCARBAMOL 1000 MG: 500 TABLET ORAL at 12:59

## 2018-05-05 RX ADMIN — POLYETHYLENE GLYCOL 3350 17 G: 17 POWDER, FOR SOLUTION ORAL at 17:50

## 2018-05-05 RX ADMIN — OXYCODONE HYDROCHLORIDE 10 MG: 10 TABLET ORAL at 17:46

## 2018-05-05 RX ADMIN — DOCUSATE SODIUM 100 MG: 100 CAPSULE, LIQUID FILLED ORAL at 21:13

## 2018-05-05 RX ADMIN — METHOCARBAMOL 1000 MG: 500 TABLET ORAL at 21:12

## 2018-05-05 RX ADMIN — BACITRACIN ZINC: 500 OINTMENT TOPICAL at 09:30

## 2018-05-05 RX ADMIN — ACETAMINOPHEN 650 MG: 325 TABLET, FILM COATED ORAL at 17:46

## 2018-05-05 RX ADMIN — ENOXAPARIN SODIUM 30 MG: 30 INJECTION SUBCUTANEOUS at 21:12

## 2018-05-05 RX ADMIN — ACETAMINOPHEN 650 MG: 325 TABLET, FILM COATED ORAL at 21:12

## 2018-05-05 RX ADMIN — BACITRACIN ZINC: 500 OINTMENT TOPICAL at 21:13

## 2018-05-05 RX ADMIN — ACETAMINOPHEN 650 MG: 325 TABLET, FILM COATED ORAL at 09:28

## 2018-05-05 RX ADMIN — DIPHENHYDRAMINE HYDROCHLORIDE 25 MG: 50 INJECTION, SOLUTION INTRAMUSCULAR; INTRAVENOUS at 15:42

## 2018-05-05 RX ADMIN — OXYCODONE HYDROCHLORIDE 10 MG: 10 TABLET ORAL at 05:32

## 2018-05-05 RX ADMIN — Medication 10 ML: at 21:13

## 2018-05-05 RX ADMIN — ENOXAPARIN SODIUM 30 MG: 30 INJECTION SUBCUTANEOUS at 09:30

## 2018-05-05 RX ADMIN — Medication 10 ML: at 09:32

## 2018-05-05 RX ADMIN — ACETAMINOPHEN 650 MG: 325 TABLET, FILM COATED ORAL at 12:58

## 2018-05-05 RX ADMIN — OXYCODONE HYDROCHLORIDE 10 MG: 10 TABLET ORAL at 09:28

## 2018-05-05 RX ADMIN — METHOCARBAMOL 1000 MG: 500 TABLET ORAL at 17:50

## 2018-05-05 RX ADMIN — MAGNESIUM HYDROXIDE 30 ML: 400 SUSPENSION ORAL at 19:10

## 2018-05-05 ASSESSMENT — PAIN SCALES - GENERAL
PAINLEVEL_OUTOF10: 9
PAINLEVEL_OUTOF10: 4
PAINLEVEL_OUTOF10: 10
PAINLEVEL_OUTOF10: 8
PAINLEVEL_OUTOF10: 0
PAINLEVEL_OUTOF10: 10
PAINLEVEL_OUTOF10: 8
PAINLEVEL_OUTOF10: 6
PAINLEVEL_OUTOF10: 8

## 2018-05-05 ASSESSMENT — PAIN DESCRIPTION - ORIENTATION
ORIENTATION: RIGHT
ORIENTATION: ANTERIOR

## 2018-05-05 ASSESSMENT — PAIN DESCRIPTION - LOCATION
LOCATION: ABDOMEN;PELVIS
LOCATION: ABDOMEN
LOCATION: ABDOMEN;LEG
LOCATION: ABDOMEN;LEG

## 2018-05-05 ASSESSMENT — PAIN DESCRIPTION - PAIN TYPE
TYPE: ACUTE PAIN
TYPE: ACUTE PAIN;SURGICAL PAIN
TYPE: SURGICAL PAIN

## 2018-05-05 ASSESSMENT — PAIN DESCRIPTION - DESCRIPTORS
DESCRIPTORS: DISCOMFORT
DESCRIPTORS: ACHING;DULL;CONSTANT
DESCRIPTORS: ACHING;CONSTANT
DESCRIPTORS: ACHING;DISCOMFORT;CONSTANT

## 2018-05-05 ASSESSMENT — PAIN DESCRIPTION - FREQUENCY: FREQUENCY: CONTINUOUS

## 2018-05-05 ASSESSMENT — PAIN DESCRIPTION - PROGRESSION: CLINICAL_PROGRESSION: GRADUALLY IMPROVING

## 2018-05-05 ASSESSMENT — PAIN DESCRIPTION - ONSET: ONSET: ON-GOING

## 2018-05-06 VITALS
TEMPERATURE: 98.8 F | BODY MASS INDEX: 23.34 KG/M2 | RESPIRATION RATE: 17 BRPM | OXYGEN SATURATION: 99 % | HEIGHT: 70 IN | SYSTOLIC BLOOD PRESSURE: 128 MMHG | WEIGHT: 163 LBS | HEART RATE: 82 BPM | DIASTOLIC BLOOD PRESSURE: 70 MMHG

## 2018-05-06 LAB
ALBUMIN SERPL-MCNC: 3.4 G/DL (ref 3.5–5.2)
ALP BLD-CCNC: 47 U/L (ref 40–129)
ALT SERPL-CCNC: 27 U/L (ref 0–40)
ANION GAP SERPL CALCULATED.3IONS-SCNC: 10 MMOL/L (ref 7–16)
AST SERPL-CCNC: 59 U/L (ref 0–39)
BASOPHILS ABSOLUTE: 0.03 E9/L (ref 0–0.2)
BASOPHILS RELATIVE PERCENT: 0.3 % (ref 0–2)
BILIRUB SERPL-MCNC: 0.4 MG/DL (ref 0–1.2)
BUN BLDV-MCNC: 6 MG/DL (ref 6–20)
CALCIUM SERPL-MCNC: 8.6 MG/DL (ref 8.6–10.2)
CHLORIDE BLD-SCNC: 98 MMOL/L (ref 98–107)
CO2: 31 MMOL/L (ref 22–29)
CREAT SERPL-MCNC: 0.9 MG/DL (ref 0.7–1.2)
EOSINOPHILS ABSOLUTE: 0.12 E9/L (ref 0.05–0.5)
EOSINOPHILS RELATIVE PERCENT: 1.4 % (ref 0–6)
GFR AFRICAN AMERICAN: >60
GFR NON-AFRICAN AMERICAN: >60 ML/MIN/1.73
GLUCOSE BLD-MCNC: 106 MG/DL (ref 74–109)
HCT VFR BLD CALC: 32.4 % (ref 37–54)
HEMOGLOBIN: 11.2 G/DL (ref 12.5–16.5)
IMMATURE GRANULOCYTES #: 0.02 E9/L
IMMATURE GRANULOCYTES %: 0.2 % (ref 0–5)
LYMPHOCYTES ABSOLUTE: 1.77 E9/L (ref 1.5–4)
LYMPHOCYTES RELATIVE PERCENT: 20.2 % (ref 20–42)
MCH RBC QN AUTO: 28.9 PG (ref 26–35)
MCHC RBC AUTO-ENTMCNC: 34.6 % (ref 32–34.5)
MCV RBC AUTO: 83.5 FL (ref 80–99.9)
MONOCYTES ABSOLUTE: 0.95 E9/L (ref 0.1–0.95)
MONOCYTES RELATIVE PERCENT: 10.8 % (ref 2–12)
NEUTROPHILS ABSOLUTE: 5.87 E9/L (ref 1.8–7.3)
NEUTROPHILS RELATIVE PERCENT: 67.1 % (ref 43–80)
PDW BLD-RTO: 12.4 FL (ref 11.5–15)
PLATELET # BLD: 172 E9/L (ref 130–450)
PMV BLD AUTO: 11.4 FL (ref 7–12)
POTASSIUM REFLEX MAGNESIUM: 3.7 MMOL/L (ref 3.5–5)
RBC # BLD: 3.88 E12/L (ref 3.8–5.8)
SODIUM BLD-SCNC: 139 MMOL/L (ref 132–146)
TOTAL PROTEIN: 6.4 G/DL (ref 6.4–8.3)
WBC # BLD: 8.8 E9/L (ref 4.5–11.5)

## 2018-05-06 PROCEDURE — 85025 COMPLETE CBC W/AUTO DIFF WBC: CPT

## 2018-05-06 PROCEDURE — 6370000000 HC RX 637 (ALT 250 FOR IP): Performed by: STUDENT IN AN ORGANIZED HEALTH CARE EDUCATION/TRAINING PROGRAM

## 2018-05-06 PROCEDURE — 36415 COLL VENOUS BLD VENIPUNCTURE: CPT

## 2018-05-06 PROCEDURE — 80053 COMPREHEN METABOLIC PANEL: CPT

## 2018-05-06 PROCEDURE — 6360000002 HC RX W HCPCS: Performed by: STUDENT IN AN ORGANIZED HEALTH CARE EDUCATION/TRAINING PROGRAM

## 2018-05-06 PROCEDURE — L1832 KO ADJ JNT POS R SUP PRE CST: HCPCS

## 2018-05-06 PROCEDURE — 99238 HOSP IP/OBS DSCHRG MGMT 30/<: CPT | Performed by: SURGERY

## 2018-05-06 RX ORDER — POLYETHYLENE GLYCOL 3350 17 G/17G
17 POWDER, FOR SOLUTION ORAL 2 TIMES DAILY
Qty: 527 G | Refills: 1 | COMMUNITY
Start: 2018-05-06 | End: 2018-06-05

## 2018-05-06 RX ORDER — METHOCARBAMOL 500 MG/1
1000 TABLET, FILM COATED ORAL 4 TIMES DAILY
Qty: 80 TABLET | Refills: 0 | Status: SHIPPED | OUTPATIENT
Start: 2018-05-06 | End: 2018-05-16

## 2018-05-06 RX ADMIN — METHOCARBAMOL 1000 MG: 500 TABLET ORAL at 16:50

## 2018-05-06 RX ADMIN — METHOCARBAMOL 1000 MG: 500 TABLET ORAL at 13:38

## 2018-05-06 RX ADMIN — POLYETHYLENE GLYCOL 3350 17 G: 17 POWDER, FOR SOLUTION ORAL at 09:47

## 2018-05-06 RX ADMIN — OXYCODONE HYDROCHLORIDE 10 MG: 10 TABLET ORAL at 00:37

## 2018-05-06 RX ADMIN — DOCUSATE SODIUM 100 MG: 100 CAPSULE, LIQUID FILLED ORAL at 09:45

## 2018-05-06 RX ADMIN — ACETAMINOPHEN 650 MG: 325 TABLET, FILM COATED ORAL at 16:50

## 2018-05-06 RX ADMIN — OXYCODONE HYDROCHLORIDE 10 MG: 10 TABLET ORAL at 07:39

## 2018-05-06 RX ADMIN — BACITRACIN ZINC: 500 OINTMENT TOPICAL at 09:47

## 2018-05-06 RX ADMIN — OXYCODONE HYDROCHLORIDE 10 MG: 10 TABLET ORAL at 16:51

## 2018-05-06 RX ADMIN — STANDARDIZED SENNA CONCENTRATE AND DOCUSATE SODIUM 1 TABLET: 8.6; 5 TABLET, FILM COATED ORAL at 09:45

## 2018-05-06 RX ADMIN — BACITRACIN ZINC: 500 OINTMENT TOPICAL at 13:38

## 2018-05-06 RX ADMIN — METHOCARBAMOL 1000 MG: 500 TABLET ORAL at 09:45

## 2018-05-06 RX ADMIN — OXYCODONE HYDROCHLORIDE 10 MG: 10 TABLET ORAL at 11:43

## 2018-05-06 RX ADMIN — ACETAMINOPHEN 650 MG: 325 TABLET, FILM COATED ORAL at 13:38

## 2018-05-06 RX ADMIN — ENOXAPARIN SODIUM 30 MG: 30 INJECTION SUBCUTANEOUS at 09:47

## 2018-05-06 RX ADMIN — ACETAMINOPHEN 650 MG: 325 TABLET, FILM COATED ORAL at 09:45

## 2018-05-06 ASSESSMENT — PAIN DESCRIPTION - DESCRIPTORS
DESCRIPTORS: ACHING;DISCOMFORT;SORE
DESCRIPTORS: ACHING;CONSTANT;DISCOMFORT
DESCRIPTORS: BURNING;CONSTANT

## 2018-05-06 ASSESSMENT — PAIN SCALES - GENERAL
PAINLEVEL_OUTOF10: 0
PAINLEVEL_OUTOF10: 3
PAINLEVEL_OUTOF10: 8
PAINLEVEL_OUTOF10: 3
PAINLEVEL_OUTOF10: 0
PAINLEVEL_OUTOF10: 9
PAINLEVEL_OUTOF10: 4
PAINLEVEL_OUTOF10: 0
PAINLEVEL_OUTOF10: 0
PAINLEVEL_OUTOF10: 9
PAINLEVEL_OUTOF10: 0
PAINLEVEL_OUTOF10: 8
PAINLEVEL_OUTOF10: 6

## 2018-05-06 ASSESSMENT — PAIN DESCRIPTION - LOCATION
LOCATION: LEG
LOCATION: GENERALIZED
LOCATION: LEG
LOCATION: PELVIS
LOCATION: LEG
LOCATION: LEG

## 2018-05-06 ASSESSMENT — PAIN DESCRIPTION - ORIENTATION
ORIENTATION: RIGHT

## 2018-05-06 ASSESSMENT — PAIN DESCRIPTION - ONSET
ONSET: ON-GOING
ONSET: ON-GOING

## 2018-05-06 ASSESSMENT — PAIN DESCRIPTION - PAIN TYPE
TYPE: SURGICAL PAIN
TYPE: SURGICAL PAIN;ACUTE PAIN
TYPE: SURGICAL PAIN
TYPE: SURGICAL PAIN

## 2018-05-06 ASSESSMENT — PAIN DESCRIPTION - PROGRESSION
CLINICAL_PROGRESSION: NOT CHANGED
CLINICAL_PROGRESSION: GRADUALLY IMPROVING

## 2018-05-06 ASSESSMENT — PAIN DESCRIPTION - FREQUENCY
FREQUENCY: CONTINUOUS
FREQUENCY: CONTINUOUS

## 2018-05-07 LAB
BLOOD BANK DISPENSE STATUS: NORMAL
BLOOD BANK PRODUCT CODE: NORMAL
BPU ID: NORMAL
DESCRIPTION BLOOD BANK: NORMAL

## 2018-05-14 ENCOUNTER — OFFICE VISIT (OUTPATIENT)
Dept: ORTHOPEDIC SURGERY | Age: 28
End: 2018-05-14

## 2018-05-14 VITALS
SYSTOLIC BLOOD PRESSURE: 123 MMHG | HEART RATE: 92 BPM | WEIGHT: 142 LBS | BODY MASS INDEX: 19.88 KG/M2 | HEIGHT: 71 IN | DIASTOLIC BLOOD PRESSURE: 77 MMHG | TEMPERATURE: 97.2 F

## 2018-05-14 DIAGNOSIS — S72.421E: Primary | ICD-10-CM

## 2018-05-14 DIAGNOSIS — S32.301D CLOSED FRACTURE OF RIGHT ILIAC WING WITH ROUTINE HEALING, SUBSEQUENT ENCOUNTER: ICD-10-CM
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
## 2018-05-14 PROCEDURE — 99024 POSTOP FOLLOW-UP VISIT: CPT | Performed by: ORTHOPAEDIC SURGERY

## 2018-05-14 RX ORDER — OXYCODONE HYDROCHLORIDE 5 MG/1
5 TABLET ORAL EVERY 6 HOURS PRN
COMMUNITY

## 2018-05-14 RX ORDER — OXYCODONE HYDROCHLORIDE AND ACETAMINOPHEN 5; 325 MG/1; MG/1
1 TABLET ORAL EVERY 6 HOURS PRN
Qty: 28 TABLET | Refills: 0 | Status: SHIPPED | OUTPATIENT
Start: 2018-05-14 | End: 2018-05-21

## 2018-05-30 ENCOUNTER — HOSPITAL ENCOUNTER (OUTPATIENT)
Dept: PHYSICAL THERAPY | Age: 28
Setting detail: THERAPIES SERIES
Discharge: HOME | End: 2018-05-30
Payer: COMMERCIAL

## 2018-05-30 PROCEDURE — G8978 MOBILITY CURRENT STATUS: HCPCS | Performed by: PHYSICAL THERAPIST

## 2018-05-30 PROCEDURE — 97161 PT EVAL LOW COMPLEX 20 MIN: CPT | Performed by: PHYSICAL THERAPIST

## 2018-05-30 PROCEDURE — G8979 MOBILITY GOAL STATUS: HCPCS | Performed by: PHYSICAL THERAPIST

## 2018-05-30 ASSESSMENT — PAIN DESCRIPTION - ORIENTATION: ORIENTATION: RIGHT

## 2018-05-30 ASSESSMENT — PAIN DESCRIPTION - LOCATION: LOCATION: KNEE;LEG

## 2018-05-30 ASSESSMENT — PAIN DESCRIPTION - DESCRIPTORS: DESCRIPTORS: THROBBING

## 2018-05-30 ASSESSMENT — PAIN DESCRIPTION - PAIN TYPE: TYPE: ACUTE PAIN

## 2018-05-30 ASSESSMENT — PAIN SCALES - GENERAL: PAINLEVEL_OUTOF10: 7

## 2018-06-25 ENCOUNTER — OFFICE VISIT (OUTPATIENT)
Dept: ORTHOPEDIC SURGERY | Age: 28
End: 2018-06-25

## 2018-06-25 VITALS
DIASTOLIC BLOOD PRESSURE: 65 MMHG | TEMPERATURE: 97.8 F | HEIGHT: 71 IN | SYSTOLIC BLOOD PRESSURE: 102 MMHG | HEART RATE: 70 BPM | BODY MASS INDEX: 19.88 KG/M2 | WEIGHT: 142 LBS

## 2018-06-25 DIAGNOSIS — S72.421E: ICD-10-CM
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
## 2018-06-25 DIAGNOSIS — S82.131B RIGHT MEDIAL TIBIAL PLATEAU FRACTURE, OPEN TYPE I OR II, INITIAL ENCOUNTER: ICD-10-CM

## 2018-06-25 DIAGNOSIS — S32.301D CLOSED FRACTURE OF RIGHT ILIAC WING WITH ROUTINE HEALING, SUBSEQUENT ENCOUNTER: Primary | ICD-10-CM

## 2018-06-25 PROCEDURE — 99024 POSTOP FOLLOW-UP VISIT: CPT | Performed by: ORTHOPAEDIC SURGERY

## 2018-06-25 RX ORDER — OXYCODONE HYDROCHLORIDE AND ACETAMINOPHEN 5; 325 MG/1; MG/1
1 TABLET ORAL EVERY 6 HOURS PRN
Qty: 28 TABLET | Refills: 0 | OUTPATIENT
Start: 2018-06-25 | End: 2018-07-02

## 2024-04-17 ENCOUNTER — HOSPITAL ENCOUNTER (EMERGENCY)
Age: 34
Discharge: HOME | End: 2024-04-17
Attending: EMERGENCY MEDICINE
Payer: COMMERCIAL

## 2024-04-17 VITALS
DIASTOLIC BLOOD PRESSURE: 70 MMHG | RESPIRATION RATE: 16 BRPM | WEIGHT: 142 LBS | SYSTOLIC BLOOD PRESSURE: 128 MMHG | BODY MASS INDEX: 19.8 KG/M2 | TEMPERATURE: 97.8 F | HEART RATE: 66 BPM | OXYGEN SATURATION: 97 %

## 2024-04-17 DIAGNOSIS — R10.31 RIGHT GROIN PAIN: Primary | ICD-10-CM

## 2024-04-17 PROCEDURE — 96374 THER/PROPH/DIAG INJ IV PUSH: CPT

## 2024-04-17 PROCEDURE — 6360000002 HC RX W HCPCS: Performed by: EMERGENCY MEDICINE

## 2024-04-17 PROCEDURE — 99284 EMERGENCY DEPT VISIT MOD MDM: CPT

## 2024-04-17 RX ORDER — KETOROLAC TROMETHAMINE 30 MG/ML
15 INJECTION, SOLUTION INTRAMUSCULAR; INTRAVENOUS ONCE
Status: COMPLETED | OUTPATIENT
Start: 2024-04-17 | End: 2024-04-17

## 2024-04-17 RX ORDER — LIDOCAINE 50 MG/G
1 PATCH TOPICAL DAILY
Qty: 10 PATCH | Refills: 0 | Status: SHIPPED | OUTPATIENT
Start: 2024-04-17 | End: 2024-04-27

## 2024-04-17 RX ADMIN — KETOROLAC TROMETHAMINE 15 MG: 30 INJECTION, SOLUTION INTRAMUSCULAR at 12:45

## 2024-04-17 ASSESSMENT — PAIN SCALES - GENERAL: PAINLEVEL_OUTOF10: 6

## 2024-04-17 ASSESSMENT — PAIN DESCRIPTION - LOCATION: LOCATION: ABDOMEN
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
## (undated) DEVICE — SURGICAL PROCEDURE PACK TRAUM

## (undated) DEVICE — INSUFFLATION NEEDLE TO ESTABLISH PNEUMOPERITONEUM.: Brand: INSUFFLATION NEEDLE

## (undated) DEVICE — ELECTRODE PT RET AD L9FT HI MOIST COND ADH HYDRGEL CORDED

## (undated) DEVICE — HANDPIECE SET WITH BONE CLEANING TIP AND SUCTION TUBE: Brand: INTERPULSE

## (undated) DEVICE — TOTAL TRAY, DB, 100% SILI FOLEY, 16FR 10: Brand: MEDLINE

## (undated) DEVICE — DOUBLE BASIN SET: Brand: MEDLINE INDUSTRIES, INC.

## (undated) DEVICE — ZIMMER® STERILE DISPOSABLE TOURNIQUET CUFF WITH PROTECTIVE SLEEVE AND PLC, DUAL PORT, SINGLE BLADDER, 24 IN. (61 CM)

## (undated) DEVICE — DRESSING,GAUZE,XEROFORM,CURAD,5"X9",ST: Brand: CURAD

## (undated) DEVICE — GAUZE,SPONGE,AVANT,4"X4",4PLY,STRL,10/TR: Brand: MEDLINE

## (undated) DEVICE — BNDG,ELSTC,MATRIX,STRL,6"X5YD,LF,HOOK&LP: Brand: MEDLINE

## (undated) DEVICE — TOTAL KNEE PK

## (undated) DEVICE — TROCAR: Brand: KII FIOS FIRST ENTRY

## (undated) DEVICE — GOWN,SIRUS,FABRNF,XL,20/CS: Brand: MEDLINE

## (undated) DEVICE — SET INSTRUMENT LAP I

## (undated) DEVICE — SYRINGE MED 20ML STD CLR PLAS LUERLOCK TIP N CTRL DISP

## (undated) DEVICE — GENERAL SCOPES AND LIGHT CORD 5 AND 10MM

## (undated) DEVICE — READY WET SKIN SCRUB TRAY-LF: Brand: MEDLINE INDUSTRIES, INC.

## (undated) DEVICE — DRIP REDUCTION MANIFOLD

## (undated) DEVICE — TIBURON GENERAL ENDOSCOPY DRAPE: Brand: CONVERTORS

## (undated) DEVICE — DRAPE THER FLUID WARMING 66X44 IN FLAT SLUSH DBL DISC ORS

## (undated) DEVICE — COAXIAL FEMORAL CANAL TIP

## (undated) DEVICE — ZIMMER® STERILE DISPOSABLE TOURNIQUET CUFF WITH PROTECTIVE SLEEVE AND PLC, DUAL PORT, SINGLE BLADDER, 34 IN. (86 CM)

## (undated) DEVICE — 3M™ STERI-DRAPE™ ISOLATION BAG, 10 PER CARTON / 4 CARTONS PER CASE, 1003: Brand: 3M™ STERI-DRAPE™

## (undated) DEVICE — SKIN AFFIX SURG ADHESIVE 72/CS 0.55ML: Brand: MEDLINE

## (undated) DEVICE — BANDAGE,GAUZE,4.5"X4.1YD,STERILE,LF: Brand: MEDLINE

## (undated) DEVICE — DRAPE,REIN 53X77,STERILE: Brand: MEDLINE

## (undated) DEVICE — SET ENDO INSTR LAPAROSCOPIC STGENLAP

## (undated) DEVICE — ANTI-FOG SOLUTION WITH FOAM PAD: Brand: DEVON

## (undated) DEVICE — TOWEL,OR,DSP,ST,BLUE,STD,6/PK,12PK/CS: Brand: MEDLINE

## (undated) DEVICE — GENERATOR ELECSURG FORCETRAID

## (undated) DEVICE — INSUFFLATION TUBING SET WITH FILTER, FUNNEL CONNECTOR AND LUER LOCK: Brand: JOSNOE MEDICAL INC

## (undated) DEVICE — COAXIAL HIGH FLOW TIP WITH SOFT SHIELD

## (undated) DEVICE — GLOVE ORANGE PI 7 1/2   MSG9075

## (undated) DEVICE — GLOVE ORANGE PI 8   MSG9080

## (undated) DEVICE — CAMERA ENDOSCP VID HI DEF GEN

## (undated) DEVICE — 1.5L THIN WALL CAN: Brand: CRD